# Patient Record
Sex: MALE | Race: WHITE
[De-identification: names, ages, dates, MRNs, and addresses within clinical notes are randomized per-mention and may not be internally consistent; named-entity substitution may affect disease eponyms.]

---

## 2019-06-21 ENCOUNTER — HOSPITAL ENCOUNTER (EMERGENCY)
Dept: HOSPITAL 95 - ER | Age: 54
Discharge: HOME | End: 2019-06-21
Payer: MEDICARE

## 2019-06-21 VITALS — BODY MASS INDEX: 24.5 KG/M2 | HEIGHT: 71 IN | WEIGHT: 175 LBS

## 2019-06-21 DIAGNOSIS — I10: ICD-10-CM

## 2019-06-21 DIAGNOSIS — G40.909: ICD-10-CM

## 2019-06-21 DIAGNOSIS — Z79.899: ICD-10-CM

## 2019-06-21 DIAGNOSIS — N39.0: Primary | ICD-10-CM

## 2019-06-21 DIAGNOSIS — R53.1: ICD-10-CM

## 2019-06-21 DIAGNOSIS — F32.9: ICD-10-CM

## 2019-06-21 DIAGNOSIS — G47.00: ICD-10-CM

## 2019-06-21 LAB
ALBUMIN SERPL BCP-MCNC: 3.6 G/DL (ref 3.4–5)
ALBUMIN/GLOB SERPL: 0.7 {RATIO} (ref 0.8–1.8)
ALT SERPL W P-5'-P-CCNC: 26 U/L (ref 12–78)
ANION GAP SERPL CALCULATED.4IONS-SCNC: 9 MMOL/L (ref 6–16)
AST SERPL W P-5'-P-CCNC: 44 U/L (ref 12–37)
BASOPHILS # BLD AUTO: 0.01 K/MM3 (ref 0–0.23)
BASOPHILS NFR BLD AUTO: 0 % (ref 0–2)
BILIRUB SERPL-MCNC: 0.3 MG/DL (ref 0.1–1)
BUN SERPL-MCNC: 25 MG/DL (ref 8–24)
CALCIUM SERPL-MCNC: 9.4 MG/DL (ref 8.5–10.1)
CHLORIDE SERPL-SCNC: 94 MMOL/L (ref 98–108)
CO2 SERPL-SCNC: 28 MMOL/L (ref 21–32)
CREAT SERPL-MCNC: 0.75 MG/DL (ref 0.6–1.2)
DEPRECATED RDW RBC AUTO: 46 FL (ref 35.1–46.3)
EOSINOPHIL # BLD AUTO: 0.01 K/MM3 (ref 0–0.68)
EOSINOPHIL NFR BLD AUTO: 0 % (ref 0–6)
ERYTHROCYTE [DISTWIDTH] IN BLOOD BY AUTOMATED COUNT: 13.5 % (ref 11.7–14.2)
GLOBULIN SER CALC-MCNC: 5.4 G/DL (ref 2.2–4)
GLUCOSE SERPL-MCNC: 126 MG/DL (ref 70–99)
HCT VFR BLD AUTO: 37.2 % (ref 37–53)
HGB BLD-MCNC: 12.3 G/DL (ref 13.5–17.5)
IMM GRANULOCYTES # BLD AUTO: 0.02 K/MM3 (ref 0–0.1)
IMM GRANULOCYTES NFR BLD AUTO: 1 % (ref 0–1)
KETONES UR STRIP-MCNC: (no result) MG/DL
LYMPHOCYTES # BLD AUTO: 0.91 K/MM3 (ref 0.84–5.2)
LYMPHOCYTES NFR BLD AUTO: 29 % (ref 21–46)
MCHC RBC AUTO-ENTMCNC: 33.1 G/DL (ref 31.5–36.5)
MCV RBC AUTO: 92 FL (ref 80–100)
MONOCYTES # BLD AUTO: 0.34 K/MM3 (ref 0.16–1.47)
MONOCYTES NFR BLD AUTO: 11 % (ref 4–13)
NEUTROPHILS # BLD AUTO: 1.81 K/MM3 (ref 1.96–9.15)
NEUTROPHILS NFR BLD AUTO: 58 % (ref 41–73)
NRBC # BLD AUTO: 0 K/MM3 (ref 0–0.02)
NRBC BLD AUTO-RTO: 0 /100 WBC (ref 0–0.2)
PLATELET # BLD AUTO: 213 K/MM3 (ref 150–400)
POTASSIUM SERPL-SCNC: 3.9 MMOL/L (ref 3.5–5.5)
PROT SERPL-MCNC: 9 G/DL (ref 6.4–8.2)
RBC #/AREA URNS HPF: (no result) /HPF (ref 0–2)
SODIUM SERPL-SCNC: 131 MMOL/L (ref 136–145)
SP GR SPEC: 1.01 (ref 1–1.02)
UROBILINOGEN UR STRIP-MCNC: (no result) MG/DL
WBC #/AREA URNS HPF: (no result) /HPF (ref 0–5)

## 2024-11-09 ENCOUNTER — HOSPITAL ENCOUNTER (EMERGENCY)
Dept: HOSPITAL 95 - ER | Age: 59
Discharge: HOME | End: 2024-11-09
Payer: MEDICARE

## 2024-11-09 VITALS — SYSTOLIC BLOOD PRESSURE: 126 MMHG | DIASTOLIC BLOOD PRESSURE: 80 MMHG

## 2024-11-09 VITALS — HEIGHT: 69 IN | BODY MASS INDEX: 46.65 KG/M2 | WEIGHT: 315 LBS

## 2024-11-09 DIAGNOSIS — K59.09: ICD-10-CM

## 2024-11-09 DIAGNOSIS — F05: ICD-10-CM

## 2024-11-09 DIAGNOSIS — Z79.899: ICD-10-CM

## 2024-11-09 DIAGNOSIS — F03.90: Primary | ICD-10-CM

## 2024-11-09 DIAGNOSIS — R74.01: ICD-10-CM

## 2024-11-09 DIAGNOSIS — G47.00: ICD-10-CM

## 2024-11-09 DIAGNOSIS — G40.909: ICD-10-CM

## 2024-11-09 DIAGNOSIS — I10: ICD-10-CM

## 2024-11-09 LAB
ALBUMIN SERPL BCP-MCNC: 4.1 G/DL (ref 3.4–5)
ALBUMIN/GLOB SERPL: 1.1 {RATIO} (ref 0.8–1.8)
ALT SERPL W P-5'-P-CCNC: 101 U/L (ref 12–78)
ANION GAP SERPL CALCULATED.4IONS-SCNC: 11 MMOL/L (ref 3–11)
AST SERPL W P-5'-P-CCNC: 518 U/L (ref 12–37)
BASOPHILS # BLD AUTO: 0.02 K/MM3 (ref 0–0.23)
BASOPHILS NFR BLD AUTO: 0 % (ref 0–2)
BILIRUB SERPL-MCNC: 0.4 MG/DL (ref 0.1–1)
BUN SERPL-MCNC: 37 MG/DL (ref 8–24)
CALCIUM SERPL-MCNC: 9.5 MG/DL (ref 8.5–10.1)
CHLORIDE SERPL-SCNC: 114 MMOL/L (ref 98–108)
CO2 SERPL-SCNC: 25 MMOL/L (ref 21–32)
CREAT SERPL-MCNC: 0.79 MG/DL (ref 0.6–1.2)
DEPRECATED RDW RBC AUTO: 47.5 FL (ref 35.1–46.3)
EOSINOPHIL # BLD AUTO: 0.01 K/MM3 (ref 0–0.68)
EOSINOPHIL NFR BLD AUTO: 0 % (ref 0–6)
ERYTHROCYTE [DISTWIDTH] IN BLOOD BY AUTOMATED COUNT: 13.5 % (ref 11.7–14.2)
GLOBULIN SER CALC-MCNC: 3.9 G/DL (ref 2.2–4)
GLUCOSE SERPL-MCNC: 112 MG/DL (ref 70–99)
HCT VFR BLD AUTO: 38.1 % (ref 37–53)
HGB BLD-MCNC: 12.7 G/DL (ref 13.5–17.5)
IMM GRANULOCYTES # BLD AUTO: 0.02 K/MM3 (ref 0–0.1)
IMM GRANULOCYTES NFR BLD AUTO: 0 % (ref 0–1)
KETONES UR STRIP-MCNC: (no result) MG/DL
LYMPHOCYTES # BLD AUTO: 1.06 K/MM3 (ref 0.84–5.2)
LYMPHOCYTES NFR BLD AUTO: 13 % (ref 21–46)
MAGNESIUM SERPL-MCNC: 2.7 MG/DL (ref 1.6–2.4)
MCHC RBC AUTO-ENTMCNC: 33.3 G/DL (ref 31.5–36.5)
MCV RBC AUTO: 95 FL (ref 80–100)
MONOCYTES # BLD AUTO: 1.09 K/MM3 (ref 0.16–1.47)
MONOCYTES NFR BLD AUTO: 13 % (ref 4–13)
NEUTROPHILS # BLD AUTO: 6.02 K/MM3 (ref 1.96–9.15)
NEUTROPHILS NFR BLD AUTO: 73 % (ref 41–73)
NRBC # BLD AUTO: 0 K/MM3 (ref 0–0.02)
NRBC BLD AUTO-RTO: 0 /100 WBC (ref 0–0.2)
PLATELET # BLD AUTO: 196 K/MM3 (ref 150–400)
POTASSIUM SERPL-SCNC: 3.7 MMOL/L (ref 3.5–5.5)
PROT SERPL-MCNC: 8 G/DL (ref 6.4–8.2)
PROT UR STRIP-MCNC: (no result) MG/DL
RBC #/AREA URNS HPF: (no result) /HPF (ref 0–2)
SODIUM SERPL-SCNC: 146 MMOL/L (ref 136–145)
SP GR SPEC: 1.02 (ref 1–1.02)
UROBILINOGEN UR STRIP-MCNC: (no result) MG/DL
WBC #/AREA URNS HPF: (no result) /HPF (ref 0–5)

## 2024-11-10 ENCOUNTER — HOSPITAL ENCOUNTER (EMERGENCY)
Dept: HOSPITAL 95 - ER | Age: 59
Discharge: HOME | End: 2024-11-10
Payer: MEDICARE

## 2024-11-10 VITALS — DIASTOLIC BLOOD PRESSURE: 93 MMHG | SYSTOLIC BLOOD PRESSURE: 141 MMHG

## 2024-11-10 VITALS — WEIGHT: 180.01 LBS | HEIGHT: 72 IN | BODY MASS INDEX: 24.38 KG/M2

## 2024-11-10 DIAGNOSIS — G47.00: Primary | ICD-10-CM

## 2024-11-10 DIAGNOSIS — G40.909: ICD-10-CM

## 2024-11-10 DIAGNOSIS — G20.A1: ICD-10-CM

## 2024-11-10 DIAGNOSIS — Z79.899: ICD-10-CM

## 2024-11-10 DIAGNOSIS — Z59.89: ICD-10-CM

## 2024-11-10 DIAGNOSIS — I10: ICD-10-CM

## 2024-11-10 DIAGNOSIS — F79: ICD-10-CM

## 2024-11-10 PROCEDURE — A9270 NON-COVERED ITEM OR SERVICE: HCPCS

## 2024-11-10 SDOH — ECONOMIC STABILITY - INCOME SECURITY: OTHER PROBLEMS RELATED TO HOUSING AND ECONOMIC CIRCUMSTANCES: Z59.89

## 2024-11-12 LAB — LAMOTRIGINE SERPL-MCNC: 16.3 UG/ML (ref 3–15)

## 2024-11-13 ENCOUNTER — HOSPITAL ENCOUNTER (OUTPATIENT)
Dept: HOSPITAL 95 - ER | Age: 59
Setting detail: OBSERVATION
LOS: 1 days | Discharge: HOME | End: 2024-11-14
Attending: STUDENT IN AN ORGANIZED HEALTH CARE EDUCATION/TRAINING PROGRAM | Admitting: STUDENT IN AN ORGANIZED HEALTH CARE EDUCATION/TRAINING PROGRAM
Payer: MEDICARE

## 2024-11-13 VITALS — SYSTOLIC BLOOD PRESSURE: 113 MMHG | DIASTOLIC BLOOD PRESSURE: 72 MMHG

## 2024-11-13 VITALS — HEIGHT: 69 IN | WEIGHT: 159.99 LBS | BODY MASS INDEX: 23.7 KG/M2

## 2024-11-13 DIAGNOSIS — I10: ICD-10-CM

## 2024-11-13 DIAGNOSIS — G40.909: ICD-10-CM

## 2024-11-13 DIAGNOSIS — G20.A1: ICD-10-CM

## 2024-11-13 DIAGNOSIS — F20.9: Primary | ICD-10-CM

## 2024-11-13 DIAGNOSIS — Z79.899: ICD-10-CM

## 2024-11-13 LAB
ALBUMIN SERPL BCP-MCNC: 3.3 G/DL (ref 3.4–5)
ALBUMIN/GLOB SERPL: 0.9 {RATIO} (ref 0.8–1.8)
ALT SERPL W P-5'-P-CCNC: 88 U/L (ref 12–78)
ANION GAP SERPL CALCULATED.4IONS-SCNC: 12 MMOL/L (ref 3–11)
APAP SERPL-MCNC: <2 UG/ML (ref 10–30)
AST SERPL W P-5'-P-CCNC: 183 U/L (ref 12–37)
BASOPHILS # BLD AUTO: 0.01 K/MM3 (ref 0–0.23)
BASOPHILS NFR BLD AUTO: 0 % (ref 0–2)
BENZODIAZ UR-MCNC: DETECTED UG/L
BILIRUB SERPL-MCNC: 0.4 MG/DL (ref 0.1–1)
BUN SERPL-MCNC: 45 MG/DL (ref 8–24)
CALCIUM SERPL-MCNC: 8.6 MG/DL (ref 8.5–10.1)
CANNABINOIDS UR QL: DETECTED
CHLORIDE SERPL-SCNC: 108 MMOL/L (ref 98–108)
CO2 SERPL-SCNC: 25 MMOL/L (ref 21–32)
CREAT SERPL-MCNC: 0.64 MG/DL (ref 0.6–1.2)
DEPRECATED RDW RBC AUTO: 42.5 FL (ref 35.1–46.3)
EOSINOPHIL # BLD AUTO: 0 K/MM3 (ref 0–0.68)
EOSINOPHIL NFR BLD AUTO: 0 % (ref 0–6)
ERYTHROCYTE [DISTWIDTH] IN BLOOD BY AUTOMATED COUNT: 12.7 % (ref 11.7–14.2)
ETHANOL SERPL-MCNC: <3 MG/DL
FLUAV RNA SPEC QL NAA+PROBE: NEGATIVE
FLUBV RNA SPEC QL NAA+PROBE: NEGATIVE
GLOBULIN SER CALC-MCNC: 3.7 G/DL (ref 2.2–4)
GLUCOSE SERPL-MCNC: 101 MG/DL (ref 70–99)
HCT VFR BLD AUTO: 35 % (ref 37–53)
HGB BLD-MCNC: 12.1 G/DL (ref 13.5–17.5)
IMM GRANULOCYTES # BLD AUTO: 0.02 K/MM3 (ref 0–0.1)
IMM GRANULOCYTES NFR BLD AUTO: 0 % (ref 0–1)
KETONES UR STRIP-MCNC: (no result) MG/DL
LYMPHOCYTES # BLD AUTO: 1.09 K/MM3 (ref 0.84–5.2)
LYMPHOCYTES NFR BLD AUTO: 15 % (ref 21–46)
MCHC RBC AUTO-ENTMCNC: 34.6 G/DL (ref 31.5–36.5)
MCV RBC AUTO: 92 FL (ref 80–100)
MONOCYTES # BLD AUTO: 0.82 K/MM3 (ref 0.16–1.47)
MONOCYTES NFR BLD AUTO: 11 % (ref 4–13)
NEUTROPHILS # BLD AUTO: 5.38 K/MM3 (ref 1.96–9.15)
NEUTROPHILS NFR BLD AUTO: 74 % (ref 41–73)
NRBC # BLD AUTO: 0 K/MM3 (ref 0–0.02)
NRBC BLD AUTO-RTO: 0 /100 WBC (ref 0–0.2)
PLATELET # BLD AUTO: 149 K/MM3 (ref 150–400)
POTASSIUM SERPL-SCNC: 3.9 MMOL/L (ref 3.5–5.5)
PROT SERPL-MCNC: 7 G/DL (ref 6.4–8.2)
PROT UR STRIP-MCNC: (no result) MG/DL
RBC #/AREA URNS HPF: (no result) /HPF (ref 0–2)
RSV RNA SPEC QL NAA+PROBE: NEGATIVE
SALICYLATES SERPL-MCNC: <1.7 MG/DL (ref 2.8–20)
SARS-COV-2 RNA RESP QL NAA+PROBE: NEGATIVE
SODIUM SERPL-SCNC: 141 MMOL/L (ref 136–145)
SP GR SPEC: 1.01 (ref 1–1.02)
UROBILINOGEN UR STRIP-MCNC: (no result) MG/DL
WBC #/AREA URNS HPF: (no result) /HPF (ref 0–5)

## 2024-11-13 PROCEDURE — G0378 HOSPITAL OBSERVATION PER HR: HCPCS

## 2024-11-13 PROCEDURE — A9270 NON-COVERED ITEM OR SERVICE: HCPCS

## 2024-11-13 PROCEDURE — G0480 DRUG TEST DEF 1-7 CLASSES: HCPCS

## 2024-12-05 ENCOUNTER — HOSPITAL ENCOUNTER (INPATIENT)
Dept: HOSPITAL 95 - ER | Age: 59
LOS: 8 days | Discharge: HOME HEALTH | DRG: 673 | End: 2024-12-13
Attending: HOSPITALIST | Admitting: HOSPITALIST
Payer: MEDICARE

## 2024-12-05 VITALS — HEIGHT: 72 IN | WEIGHT: 154.98 LBS | BODY MASS INDEX: 20.99 KG/M2

## 2024-12-05 VITALS — SYSTOLIC BLOOD PRESSURE: 113 MMHG | DIASTOLIC BLOOD PRESSURE: 74 MMHG

## 2024-12-05 VITALS — DIASTOLIC BLOOD PRESSURE: 62 MMHG | SYSTOLIC BLOOD PRESSURE: 103 MMHG

## 2024-12-05 DIAGNOSIS — I10: ICD-10-CM

## 2024-12-05 DIAGNOSIS — F20.9: ICD-10-CM

## 2024-12-05 DIAGNOSIS — F84.0: ICD-10-CM

## 2024-12-05 DIAGNOSIS — K52.1: ICD-10-CM

## 2024-12-05 DIAGNOSIS — L89.154: ICD-10-CM

## 2024-12-05 DIAGNOSIS — B95.61: ICD-10-CM

## 2024-12-05 DIAGNOSIS — T36.95XA: ICD-10-CM

## 2024-12-05 DIAGNOSIS — E87.6: ICD-10-CM

## 2024-12-05 DIAGNOSIS — N17.9: Primary | ICD-10-CM

## 2024-12-05 DIAGNOSIS — K59.00: ICD-10-CM

## 2024-12-05 DIAGNOSIS — E44.0: ICD-10-CM

## 2024-12-05 DIAGNOSIS — R62.50: ICD-10-CM

## 2024-12-05 DIAGNOSIS — E83.39: ICD-10-CM

## 2024-12-05 DIAGNOSIS — F41.9: ICD-10-CM

## 2024-12-05 DIAGNOSIS — G47.00: ICD-10-CM

## 2024-12-05 DIAGNOSIS — G40.909: ICD-10-CM

## 2024-12-05 DIAGNOSIS — K21.9: ICD-10-CM

## 2024-12-05 DIAGNOSIS — R33.9: ICD-10-CM

## 2024-12-05 DIAGNOSIS — G20.A1: ICD-10-CM

## 2024-12-05 DIAGNOSIS — B95.7: ICD-10-CM

## 2024-12-05 DIAGNOSIS — E87.20: ICD-10-CM

## 2024-12-05 DIAGNOSIS — F32.A: ICD-10-CM

## 2024-12-05 LAB
ALBUMIN SERPL BCP-MCNC: 3.8 G/DL (ref 3.4–5)
ALBUMIN/GLOB SERPL: 1 {RATIO} (ref 0.8–1.8)
ALT SERPL W P-5'-P-CCNC: 25 U/L (ref 12–78)
ANION GAP SERPL CALCULATED.4IONS-SCNC: 17 MMOL/L (ref 3–11)
AST SERPL W P-5'-P-CCNC: 58 U/L (ref 12–37)
BASOPHILS # BLD AUTO: 0.02 K/MM3 (ref 0–0.23)
BASOPHILS NFR BLD AUTO: 0 % (ref 0–2)
BILIRUB SERPL-MCNC: 0.5 MG/DL (ref 0.1–1)
BUN SERPL-MCNC: 33 MG/DL (ref 8–24)
CALCIUM SERPL-MCNC: 9.8 MG/DL (ref 8.5–10.1)
CHLORIDE SERPL-SCNC: 106 MMOL/L (ref 98–108)
CO2 SERPL-SCNC: 20 MMOL/L (ref 21–32)
CREAT SERPL-MCNC: 1.71 MG/DL (ref 0.6–1.2)
DEPRECATED RDW RBC AUTO: 46.7 FL (ref 35.1–46.3)
EOSINOPHIL # BLD AUTO: 0 K/MM3 (ref 0–0.68)
EOSINOPHIL NFR BLD AUTO: 0 % (ref 0–6)
ERYTHROCYTE [DISTWIDTH] IN BLOOD BY AUTOMATED COUNT: 13.5 % (ref 11.7–14.2)
GLOBULIN SER CALC-MCNC: 3.9 G/DL (ref 2.2–4)
GLUCOSE SERPL-MCNC: 106 MG/DL (ref 70–99)
HCT VFR BLD AUTO: 36.5 % (ref 37–53)
HGB BLD-MCNC: 12.2 G/DL (ref 13.5–17.5)
IMM GRANULOCYTES # BLD AUTO: 0.03 K/MM3 (ref 0–0.1)
IMM GRANULOCYTES NFR BLD AUTO: 0 % (ref 0–1)
KETONES UR STRIP-MCNC: (no result) MG/DL
KETONES UR STRIP-MCNC: (no result) MG/DL
LYMPHOCYTES # BLD AUTO: 0.73 K/MM3 (ref 0.84–5.2)
LYMPHOCYTES NFR BLD AUTO: 8 % (ref 21–46)
MCHC RBC AUTO-ENTMCNC: 33.4 G/DL (ref 31.5–36.5)
MCV RBC AUTO: 94 FL (ref 80–100)
MONOCYTES # BLD AUTO: 1 K/MM3 (ref 0.16–1.47)
MONOCYTES NFR BLD AUTO: 11 % (ref 4–13)
NEUTROPHILS # BLD AUTO: 7.1 K/MM3 (ref 1.96–9.15)
NEUTROPHILS NFR BLD AUTO: 80 % (ref 41–73)
NRBC # BLD AUTO: 0 K/MM3 (ref 0–0.02)
NRBC BLD AUTO-RTO: 0 /100 WBC (ref 0–0.2)
PLATELET # BLD AUTO: 204 K/MM3 (ref 150–400)
POTASSIUM SERPL-SCNC: 4.1 MMOL/L (ref 3.5–5.5)
PROT SERPL-MCNC: 7.7 G/DL (ref 6.4–8.2)
PROT UR STRIP-MCNC: (no result) MG/DL
PROT UR STRIP-MCNC: (no result) MG/DL
RBC #/AREA URNS HPF: (no result) /HPF (ref 0–2)
RBC #/AREA URNS HPF: (no result) /HPF (ref 0–2)
SODIUM SERPL-SCNC: 139 MMOL/L (ref 136–145)
SP GR SPEC: 1.01 (ref 1–1.02)
SP GR SPEC: 1.01 (ref 1–1.02)
URN SPEC COLLECT METH UR: (no result)
UROBILINOGEN UR STRIP-MCNC: (no result) MG/DL
UROBILINOGEN UR STRIP-MCNC: (no result) MG/DL
WBC #/AREA URNS HPF: (no result) /HPF (ref 0–5)
WBC #/AREA URNS HPF: (no result) /HPF (ref 0–5)

## 2024-12-05 PROCEDURE — A9270 NON-COVERED ITEM OR SERVICE: HCPCS

## 2024-12-05 PROCEDURE — G0378 HOSPITAL OBSERVATION PER HR: HCPCS

## 2024-12-05 PROCEDURE — 0T9B70Z DRAINAGE OF BLADDER WITH DRAINAGE DEVICE, VIA NATURAL OR ARTIFICIAL OPENING: ICD-10-PCS | Performed by: HOSPITALIST

## 2024-12-06 VITALS — SYSTOLIC BLOOD PRESSURE: 120 MMHG | DIASTOLIC BLOOD PRESSURE: 96 MMHG

## 2024-12-06 VITALS — SYSTOLIC BLOOD PRESSURE: 121 MMHG | DIASTOLIC BLOOD PRESSURE: 87 MMHG

## 2024-12-06 VITALS — DIASTOLIC BLOOD PRESSURE: 84 MMHG | SYSTOLIC BLOOD PRESSURE: 115 MMHG

## 2024-12-06 VITALS — SYSTOLIC BLOOD PRESSURE: 143 MMHG | DIASTOLIC BLOOD PRESSURE: 95 MMHG

## 2024-12-06 LAB
ANION GAP SERPL CALCULATED.4IONS-SCNC: 9 MMOL/L (ref 3–11)
BASOPHILS # BLD AUTO: 0.01 K/MM3 (ref 0–0.23)
BASOPHILS NFR BLD AUTO: 0 % (ref 0–2)
BUN SERPL-MCNC: 24 MG/DL (ref 8–24)
CALCIUM SERPL-MCNC: 8.8 MG/DL (ref 8.5–10.1)
CHLORIDE SERPL-SCNC: 104 MMOL/L (ref 98–108)
CO2 SERPL-SCNC: 28 MMOL/L (ref 21–32)
CREAT SERPL-MCNC: 0.62 MG/DL (ref 0.6–1.2)
DEPRECATED RDW RBC AUTO: 46.4 FL (ref 35.1–46.3)
EOSINOPHIL # BLD AUTO: 0.01 K/MM3 (ref 0–0.68)
EOSINOPHIL NFR BLD AUTO: 0 % (ref 0–6)
ERYTHROCYTE [DISTWIDTH] IN BLOOD BY AUTOMATED COUNT: 13.6 % (ref 11.7–14.2)
GLUCOSE SERPL-MCNC: 124 MG/DL (ref 70–99)
HCT VFR BLD AUTO: 32.2 % (ref 37–53)
HGB BLD-MCNC: 10.9 G/DL (ref 13.5–17.5)
IMM GRANULOCYTES # BLD AUTO: 0.02 K/MM3 (ref 0–0.1)
IMM GRANULOCYTES NFR BLD AUTO: 0 % (ref 0–1)
LYMPHOCYTES # BLD AUTO: 1.08 K/MM3 (ref 0.84–5.2)
LYMPHOCYTES NFR BLD AUTO: 20 % (ref 21–46)
MCHC RBC AUTO-ENTMCNC: 33.9 G/DL (ref 31.5–36.5)
MCV RBC AUTO: 93 FL (ref 80–100)
MONOCYTES # BLD AUTO: 0.75 K/MM3 (ref 0.16–1.47)
MONOCYTES NFR BLD AUTO: 14 % (ref 4–13)
NEUTROPHILS # BLD AUTO: 3.46 K/MM3 (ref 1.96–9.15)
NEUTROPHILS NFR BLD AUTO: 65 % (ref 41–73)
NRBC # BLD AUTO: 0 K/MM3 (ref 0–0.02)
NRBC BLD AUTO-RTO: 0 /100 WBC (ref 0–0.2)
PLATELET # BLD AUTO: 178 K/MM3 (ref 150–400)
POTASSIUM SERPL-SCNC: 3.5 MMOL/L (ref 3.5–5.5)
SODIUM SERPL-SCNC: 137 MMOL/L (ref 136–145)

## 2024-12-06 PROCEDURE — 0JB70ZZ EXCISION OF BACK SUBCUTANEOUS TISSUE AND FASCIA, OPEN APPROACH: ICD-10-PCS | Performed by: STUDENT IN AN ORGANIZED HEALTH CARE EDUCATION/TRAINING PROGRAM

## 2024-12-06 NOTE — NUR
after several attempts to have pt have a bm i enc him to stand and transfer to
toilet. pt had very large watery stooland stated he felt better. pt ambulated
well with only stand by assist.

## 2024-12-06 NOTE — NUR
sx cancelled for today because pt had eaten a couple bites of food, pt to
possibly have sx tomorrow.
repeatedly asked pt if he was having pain but he couldnt answer appropriatly,
pt has no grimice but abd is tight and he is refusing to eat.

## 2024-12-06 NOTE — NUR
NOC SUMMARY- PT YELLS OUT AT TIMES. PT SAYS OUCH. PT REPORTS ABD AND BLADDER
DISCOMFORT. PT FOUND TO HAVE A FEVER AND PROVIDER CALLED AND PO TYLENOL WAS
ORDERED. PT DISCOMFORT AND FEVER RESPONDED WELL TO TYLENOL. PT SEEKS ATTENTION
FREQUENTLY. PT HAD A SMALL BM NOTED. PT ENCOURAGED TO DRINK FLUIDS. PT HAS NOT
SLEPT ALL SHIFT. PT HAS BEEN WATCHING TV. HORNER IS DRAINING TO GRAVITY. CALL
LIGHT IN REACH.

## 2024-12-06 NOTE — NUR
uneventful day for pt very difficult to understand or get point across, pt
follows directions but only if directed. md from consult was in to assess
coccyx wound pt was hesitant to have us look but finally allowed us, possible
sx today for i/d.

## 2024-12-07 VITALS — DIASTOLIC BLOOD PRESSURE: 94 MMHG | SYSTOLIC BLOOD PRESSURE: 135 MMHG

## 2024-12-07 VITALS — SYSTOLIC BLOOD PRESSURE: 115 MMHG | DIASTOLIC BLOOD PRESSURE: 82 MMHG

## 2024-12-07 VITALS — DIASTOLIC BLOOD PRESSURE: 82 MMHG | SYSTOLIC BLOOD PRESSURE: 107 MMHG

## 2024-12-07 VITALS — DIASTOLIC BLOOD PRESSURE: 85 MMHG | SYSTOLIC BLOOD PRESSURE: 110 MMHG

## 2024-12-07 VITALS — SYSTOLIC BLOOD PRESSURE: 108 MMHG | DIASTOLIC BLOOD PRESSURE: 73 MMHG

## 2024-12-07 VITALS — SYSTOLIC BLOOD PRESSURE: 109 MMHG | DIASTOLIC BLOOD PRESSURE: 72 MMHG

## 2024-12-07 VITALS — DIASTOLIC BLOOD PRESSURE: 81 MMHG | SYSTOLIC BLOOD PRESSURE: 122 MMHG

## 2024-12-07 VITALS — DIASTOLIC BLOOD PRESSURE: 69 MMHG | SYSTOLIC BLOOD PRESSURE: 117 MMHG

## 2024-12-07 VITALS — SYSTOLIC BLOOD PRESSURE: 126 MMHG | DIASTOLIC BLOOD PRESSURE: 84 MMHG

## 2024-12-07 VITALS — SYSTOLIC BLOOD PRESSURE: 136 MMHG | DIASTOLIC BLOOD PRESSURE: 91 MMHG

## 2024-12-07 VITALS — SYSTOLIC BLOOD PRESSURE: 111 MMHG | DIASTOLIC BLOOD PRESSURE: 80 MMHG

## 2024-12-07 VITALS — DIASTOLIC BLOOD PRESSURE: 78 MMHG | SYSTOLIC BLOOD PRESSURE: 109 MMHG

## 2024-12-07 VITALS — SYSTOLIC BLOOD PRESSURE: 106 MMHG | DIASTOLIC BLOOD PRESSURE: 73 MMHG

## 2024-12-07 VITALS — DIASTOLIC BLOOD PRESSURE: 81 MMHG | SYSTOLIC BLOOD PRESSURE: 114 MMHG

## 2024-12-07 VITALS — SYSTOLIC BLOOD PRESSURE: 117 MMHG | DIASTOLIC BLOOD PRESSURE: 85 MMHG

## 2024-12-07 VITALS — SYSTOLIC BLOOD PRESSURE: 109 MMHG | DIASTOLIC BLOOD PRESSURE: 61 MMHG

## 2024-12-07 VITALS — SYSTOLIC BLOOD PRESSURE: 118 MMHG | DIASTOLIC BLOOD PRESSURE: 84 MMHG

## 2024-12-07 VITALS — SYSTOLIC BLOOD PRESSURE: 145 MMHG | DIASTOLIC BLOOD PRESSURE: 97 MMHG

## 2024-12-07 VITALS — DIASTOLIC BLOOD PRESSURE: 79 MMHG | SYSTOLIC BLOOD PRESSURE: 116 MMHG

## 2024-12-07 VITALS — SYSTOLIC BLOOD PRESSURE: 114 MMHG | DIASTOLIC BLOOD PRESSURE: 87 MMHG

## 2024-12-07 LAB
ANION GAP SERPL CALCULATED.4IONS-SCNC: 10 MMOL/L (ref 3–11)
BASOPHILS # BLD AUTO: 0.01 K/MM3 (ref 0–0.23)
BASOPHILS NFR BLD AUTO: 0 % (ref 0–2)
BUN SERPL-MCNC: 27 MG/DL (ref 8–24)
CALCIUM SERPL-MCNC: 9 MG/DL (ref 8.5–10.1)
CHLORIDE SERPL-SCNC: 107 MMOL/L (ref 98–108)
CO2 SERPL-SCNC: 24 MMOL/L (ref 21–32)
CREAT SERPL-MCNC: 0.6 MG/DL (ref 0.6–1.2)
DEPRECATED RDW RBC AUTO: 45.2 FL (ref 35.1–46.3)
EOSINOPHIL # BLD AUTO: 0 K/MM3 (ref 0–0.68)
EOSINOPHIL NFR BLD AUTO: 0 % (ref 0–6)
ERYTHROCYTE [DISTWIDTH] IN BLOOD BY AUTOMATED COUNT: 13.2 % (ref 11.7–14.2)
GLUCOSE SERPL-MCNC: 121 MG/DL (ref 70–99)
HCT VFR BLD AUTO: 39.5 % (ref 37–53)
HGB BLD-MCNC: 13.3 G/DL (ref 13.5–17.5)
IMM GRANULOCYTES # BLD AUTO: 0.02 K/MM3 (ref 0–0.1)
IMM GRANULOCYTES NFR BLD AUTO: 0 % (ref 0–1)
LYMPHOCYTES # BLD AUTO: 0.94 K/MM3 (ref 0.84–5.2)
LYMPHOCYTES NFR BLD AUTO: 12 % (ref 21–46)
MCHC RBC AUTO-ENTMCNC: 33.7 G/DL (ref 31.5–36.5)
MCV RBC AUTO: 93 FL (ref 80–100)
MONOCYTES # BLD AUTO: 0.75 K/MM3 (ref 0.16–1.47)
MONOCYTES NFR BLD AUTO: 10 % (ref 4–13)
NEUTROPHILS # BLD AUTO: 5.9 K/MM3 (ref 1.96–9.15)
NEUTROPHILS NFR BLD AUTO: 78 % (ref 41–73)
NRBC # BLD AUTO: 0 K/MM3 (ref 0–0.02)
NRBC BLD AUTO-RTO: 0 /100 WBC (ref 0–0.2)
PLATELET # BLD AUTO: 210 K/MM3 (ref 150–400)
POTASSIUM SERPL-SCNC: 3.3 MMOL/L (ref 3.5–5.5)
SODIUM SERPL-SCNC: 138 MMOL/L (ref 136–145)

## 2024-12-07 NOTE — NUR
PT TO PACU VIA BED FROM  FOR PREOP CARE AT 1145. RESPONDS USING SINGLE
WORDS TO QUESTIONS ASKED. RESTING QUIETLY. FEBRILE 99.4/VSS STABLE. DENIES
PAIN/NAUSEA. LR AT TKO NOW INFUSING. SURICAL PACK COMPLETE. SURICAL HAT/PAS
SLEEVES/BP CUFF IN PLACE. NO COMPLAINTS AT THIS TIME.

## 2024-12-07 NOTE — NUR
pt has been sleeping since surgery, opens eyes when spoke to or touched,
returns to sleep, v.s. stable, no other changes this shift, call light in
reach.

## 2024-12-07 NOTE — NUR
pt laying in bed, nonverbal at this time, makes eye contact and answers simple
questions, follows commands, lungs are clear t/o, resp even and unlabored, no
cough noted, hrr, no edema noted, ppp+2, cap refill <3 sec, vs stable,
afebrile, piv to lac site is clear and patent, btx hypoactive, abd flat soft
nontender, voids via browning cath at this time, draining yellow urine, skin has
large decub ulcer on sacrum, plan is for I&D today, will keep npo and hold
lovenox, maew, lee, call light in reach.

## 2024-12-07 NOTE — NUR
SHIFT SUMMARY
PATIENT HAD NO ACUTE CHANGES. ALERT TO SELF AND ONE ASSIST TO BSC. DENIES
CHEST PAIN, SOB, AND N/V. VSS/AFEBRILE. HORNER PATENT AND DRAINING TO GRAVITY
FOR RETENTION. NPO FOR I&D. PIV INTACT. IV ABX INFUSED. WATCHED TV FIRST PART
OF SHIFT. CALL LIGHT IN REACH. BED IN LOWEST POSITION. WILL CONTINUE TO
MONITOR UNTIL DAY SHIFT NURSE ASSUMES CARE.

## 2024-12-08 VITALS — SYSTOLIC BLOOD PRESSURE: 136 MMHG | DIASTOLIC BLOOD PRESSURE: 96 MMHG

## 2024-12-08 VITALS — SYSTOLIC BLOOD PRESSURE: 105 MMHG | DIASTOLIC BLOOD PRESSURE: 73 MMHG

## 2024-12-08 VITALS — SYSTOLIC BLOOD PRESSURE: 120 MMHG | DIASTOLIC BLOOD PRESSURE: 77 MMHG

## 2024-12-08 VITALS — DIASTOLIC BLOOD PRESSURE: 89 MMHG | SYSTOLIC BLOOD PRESSURE: 135 MMHG

## 2024-12-08 LAB
ALBUMIN SERPL BCP-MCNC: 2.6 G/DL (ref 3.4–5)
ALBUMIN/GLOB SERPL: 0.8 {RATIO} (ref 0.8–1.8)
ALT SERPL W P-5'-P-CCNC: 16 U/L (ref 12–78)
ANION GAP SERPL CALCULATED.4IONS-SCNC: 9 MMOL/L (ref 3–11)
AST SERPL W P-5'-P-CCNC: 17 U/L (ref 12–37)
BASOPHILS # BLD AUTO: 0.02 K/MM3 (ref 0–0.23)
BASOPHILS NFR BLD AUTO: 0 % (ref 0–2)
BILIRUB SERPL-MCNC: 0.4 MG/DL (ref 0.1–1)
BUN SERPL-MCNC: 26 MG/DL (ref 8–24)
CALCIUM SERPL-MCNC: 8.7 MG/DL (ref 8.5–10.1)
CHLORIDE SERPL-SCNC: 109 MMOL/L (ref 98–108)
CO2 SERPL-SCNC: 28 MMOL/L (ref 21–32)
CREAT SERPL-MCNC: 0.69 MG/DL (ref 0.6–1.2)
DEPRECATED RDW RBC AUTO: 46.2 FL (ref 35.1–46.3)
EOSINOPHIL # BLD AUTO: 0.01 K/MM3 (ref 0–0.68)
EOSINOPHIL NFR BLD AUTO: 0 % (ref 0–6)
ERYTHROCYTE [DISTWIDTH] IN BLOOD BY AUTOMATED COUNT: 13.2 % (ref 11.7–14.2)
GLOBULIN SER CALC-MCNC: 3.3 G/DL (ref 2.2–4)
GLUCOSE SERPL-MCNC: 102 MG/DL (ref 70–99)
HCT VFR BLD AUTO: 33.9 % (ref 37–53)
HGB BLD-MCNC: 11.2 G/DL (ref 13.5–17.5)
IMM GRANULOCYTES # BLD AUTO: 0.02 K/MM3 (ref 0–0.1)
IMM GRANULOCYTES NFR BLD AUTO: 0 % (ref 0–1)
LYMPHOCYTES # BLD AUTO: 1.34 K/MM3 (ref 0.84–5.2)
LYMPHOCYTES NFR BLD AUTO: 22 % (ref 21–46)
MCHC RBC AUTO-ENTMCNC: 33 G/DL (ref 31.5–36.5)
MCV RBC AUTO: 94 FL (ref 80–100)
MONOCYTES # BLD AUTO: 0.66 K/MM3 (ref 0.16–1.47)
MONOCYTES NFR BLD AUTO: 11 % (ref 4–13)
NEUTROPHILS # BLD AUTO: 4.1 K/MM3 (ref 1.96–9.15)
NEUTROPHILS NFR BLD AUTO: 67 % (ref 41–73)
NRBC # BLD AUTO: 0 K/MM3 (ref 0–0.02)
NRBC BLD AUTO-RTO: 0 /100 WBC (ref 0–0.2)
PLATELET # BLD AUTO: 188 K/MM3 (ref 150–400)
POTASSIUM SERPL-SCNC: 3.7 MMOL/L (ref 3.5–5.5)
PROT SERPL-MCNC: 5.9 G/DL (ref 6.4–8.2)
SODIUM SERPL-SCNC: 142 MMOL/L (ref 136–145)

## 2024-12-08 NOTE — NUR
SHIFT SUMMARY
PATIENT SOMNULENT T/O SHIFT AFTER PROCEDURE. ALERT TO SELF AND BEDREST AT
THIS TIME. PO MEDICATION HELD. NO S/SX OF CHEST PAIN, SOB, AND N/V. PIV
INTACT. IV ABXS INFUSED. VSS/AFEBRILE. HORNER PATIENT AND DRAINING TO GRAVITY.
CALL LIGHT IN REACH. BED IN LOWEST POSITION.  WILL CONTINUE TO MONITOR UNTIL
DAY SHIFT NURSE ASSUMES CARE.

## 2024-12-08 NOTE — NUR
NOTE
CHANGED PT WOUND DRESSING AT 1030. DRESSING WAS SATURATED WITH SANGUINEOUS
FLUID. CLEANED WOUND WITH 4X4 GAUZE AND WOUND CLEANSER. MOISTENED KERLIX WITH
0.25% DAKINS SOLUTION AND TIGHTLY PACKED INTO ALL ASPECTS OF THE WOUND FIRST
SPIRALING IN TO FILL SPACE. COVERED WITH GAUZE AND EXUDRY ABSORBENT DRESSING,
SECURED WITH TAPE. TOOK UPDATED PIC OF WOUND AND IN CHART. PT WAS AGITATION
DURING DRESSING CHANGE AND HIT STAFF BUT AFTER COMPLETED DRESSING CHANGED
SEEMED TO CALM DOWN. CALL LIGHT IN REACH, ABLE TO MAKE NEEDS KNOWN.

## 2024-12-08 NOTE — NUR
PT TRANSFERRED TO ROOM 346. REPORT GIVEN TO GUTIERREZ CORONADO RN. PT AND BELONGINGS
TRANSPORTED TO ROOM.

## 2024-12-08 NOTE — NUR
SHIFT SUMMARY
PT A&O TO SELF. PT CALLS OUT AND MAKES NEEDS KNOWN, PT ADMITTED DUE TO ION. PT
HAD WASH OUT YESTERDAY OF COCCYX WOUND. WOUND DRESSING CHANGED TODAY. PT ON
ROOM AIR. PT RECEIVED ANTIBIOTICS. VSS. PT HAS HORNER CATH FOR RETENTION, URINE
WAS FREEFLOWING WITH NO DEPENDENT LOOPS DURING SHIFT. PT WAS REPOSITIONED Q2
HR. PT REPORTED NO PAIN. PT WOULD REFUSED MEALS BUT DRANK ENSURES FOR
NUTRITION. PLAN IS FOR PLACEMENT. PT CALL LIGHT IN REACH.

## 2024-12-09 VITALS — SYSTOLIC BLOOD PRESSURE: 142 MMHG | DIASTOLIC BLOOD PRESSURE: 99 MMHG

## 2024-12-09 VITALS — SYSTOLIC BLOOD PRESSURE: 129 MMHG | DIASTOLIC BLOOD PRESSURE: 91 MMHG

## 2024-12-09 VITALS — DIASTOLIC BLOOD PRESSURE: 98 MMHG | SYSTOLIC BLOOD PRESSURE: 142 MMHG

## 2024-12-09 VITALS — DIASTOLIC BLOOD PRESSURE: 99 MMHG | SYSTOLIC BLOOD PRESSURE: 129 MMHG

## 2024-12-09 LAB
ANION GAP SERPL CALCULATED.4IONS-SCNC: 11 MMOL/L (ref 3–11)
BUN SERPL-MCNC: 27 MG/DL (ref 8–24)
CALCIUM SERPL-MCNC: 8.4 MG/DL (ref 8.5–10.1)
CHLORIDE SERPL-SCNC: 103 MMOL/L (ref 98–108)
CO2 SERPL-SCNC: 25 MMOL/L (ref 21–32)
CREAT SERPL-MCNC: 0.55 MG/DL (ref 0.6–1.2)
GLUCOSE SERPL-MCNC: 131 MG/DL (ref 70–99)
HCT VFR BLD AUTO: 36.4 % (ref 37–53)
HGB BLD-MCNC: 12.5 G/DL (ref 13.5–17.5)
MAGNESIUM SERPL-MCNC: 1.6 MG/DL (ref 1.6–2.4)
PHOSPHATE SERPL-MCNC: 3.2 MG/DL (ref 2.5–4.9)
POTASSIUM SERPL-SCNC: 2.5 MMOL/L (ref 3.5–5.5)
SODIUM SERPL-SCNC: 136 MMOL/L (ref 136–145)

## 2024-12-09 NOTE — NUR
DRESSING CHARGED
PT WOUND WAS FILLED WITH STOOL, WOUND WAS EXTENSIVLY IRRIGATED AND REBANDAGED,
PT DOESNT SEEM TO HAVE PAIN AT WOUND SITE BUT DOES HAVE ABD PAIN.

## 2024-12-09 NOTE — NUR
REPORT RECEIVED. PT CRYING OUT AND SEEMS UNCOMFORTABLE, PT BRIEF WAS FILLED
WITH WATERY STOOL PT ABD LARGE AND DISTENDED, ASSISTED PT TO BATHROOM, PT
AMBULATORY WITH STAND BY ASSIST.

## 2024-12-09 NOTE — NUR
pt medicated for pain
md was notifed that pt might not be able to express pain as one would. i noted
slight grimice and agitation when turned i request some pain medication with
hopes that it may bring relief.

## 2024-12-09 NOTE — NUR
pt was assisted out of bed to the bathroom several times throughout shift and
did very well with minimal assist.
the third time pt was assisted to the bathroom pt had major exsplosive event
and sprayed loose stool on floor wall and door of bathroom. pt was saftly
assisted back to bed and full bed bath done, dressing change done as well.
over all pt nani well and seems to feel much better.

## 2024-12-10 VITALS — SYSTOLIC BLOOD PRESSURE: 136 MMHG | DIASTOLIC BLOOD PRESSURE: 80 MMHG

## 2024-12-10 VITALS — SYSTOLIC BLOOD PRESSURE: 133 MMHG | DIASTOLIC BLOOD PRESSURE: 88 MMHG

## 2024-12-10 VITALS — DIASTOLIC BLOOD PRESSURE: 93 MMHG | SYSTOLIC BLOOD PRESSURE: 135 MMHG

## 2024-12-10 VITALS — DIASTOLIC BLOOD PRESSURE: 87 MMHG | SYSTOLIC BLOOD PRESSURE: 141 MMHG

## 2024-12-10 LAB
ALBUMIN SERPL BCP-MCNC: 2.7 G/DL (ref 3.4–5)
ANION GAP SERPL CALCULATED.4IONS-SCNC: 10 MMOL/L (ref 3–11)
BUN SERPL-MCNC: 24 MG/DL (ref 8–24)
CALCIUM SERPL-MCNC: 8.9 MG/DL (ref 8.5–10.1)
CHLORIDE SERPL-SCNC: 104 MMOL/L (ref 98–108)
CO2 SERPL-SCNC: 25 MMOL/L (ref 21–32)
CREAT SERPL-MCNC: 0.46 MG/DL (ref 0.6–1.2)
GLUCOSE SERPL-MCNC: 131 MG/DL (ref 70–99)
HCT VFR BLD AUTO: 34.3 % (ref 37–53)
HGB BLD-MCNC: 12 G/DL (ref 13.5–17.5)
PHOSPHATE SERPL-MCNC: 2.5 MG/DL (ref 2.5–4.9)
POTASSIUM SERPL-SCNC: 2.9 MMOL/L (ref 3.5–5.5)
SODIUM SERPL-SCNC: 136 MMOL/L (ref 136–145)

## 2024-12-10 NOTE — NUR
assumed care at noon and i spoke with dr pop about very hard abd and pt
complaining of constipation. enema that was held yestureday was enc today and
implemented. pt had about 2000mls of loose dark diarrhea and a lot of gas, pt
states he is feeling better.
dressing to coccyx area cdi, pt was able to use bsc making it better for his
wound.
prior to enema pt was given ativan and was very compliant and responsive. pt
now sleeping. call light at bedside

## 2024-12-10 NOTE — NUR
shift summary
 
patient did not rest well, several large loose explosive bowel movements, Abd
is very taut
and somewhat distended, received all bowel care meds as ordered,
Anticipating form stool due to CT showing constipation. Stage 4 sacral/coccyx
wound soiled and was redressed several times, Escobar care done after each BM
also. Pt not tolerating wound care c/o pain , Sam AGOSTO overnight requested prn
pain medication prior to dressing changes Pt spit out his percocet which was
crushed in applesauce,  recommended an IV pain med, still awaiting order,
relayed above to oncoming RN
applesauce

## 2024-12-10 NOTE — NUR
PO ANTIBIOTIC CRUSHED AND ATTEMPTED TO ADMINISTER TO PT. PT GAGGED W/ PO AND
REFUSED TO FINISH ANTIBIOTIC

## 2024-12-11 VITALS — SYSTOLIC BLOOD PRESSURE: 130 MMHG | DIASTOLIC BLOOD PRESSURE: 89 MMHG

## 2024-12-11 VITALS — SYSTOLIC BLOOD PRESSURE: 145 MMHG | DIASTOLIC BLOOD PRESSURE: 100 MMHG

## 2024-12-11 VITALS — SYSTOLIC BLOOD PRESSURE: 129 MMHG | DIASTOLIC BLOOD PRESSURE: 84 MMHG

## 2024-12-11 LAB
ANION GAP SERPL CALCULATED.4IONS-SCNC: 9 MMOL/L (ref 3–11)
BUN SERPL-MCNC: 14 MG/DL (ref 8–24)
CALCIUM SERPL-MCNC: 8.6 MG/DL (ref 8.5–10.1)
CHLORIDE SERPL-SCNC: 107 MMOL/L (ref 98–108)
CO2 SERPL-SCNC: 26 MMOL/L (ref 21–32)
CREAT SERPL-MCNC: 0.57 MG/DL (ref 0.6–1.2)
GLUCOSE SERPL-MCNC: 118 MG/DL (ref 70–99)
POTASSIUM SERPL-SCNC: 2.7 MMOL/L (ref 3.5–5.5)
SODIUM SERPL-SCNC: 139 MMOL/L (ref 136–145)

## 2024-12-11 NOTE — NUR
CALLED DR SCHAFFER-
PT BECAME AGGITATED WHEN STAFF WERE ASSISTING WITH AN ATTENDS CHANGE. HE BEGAN
SLAPPING AT THE NURSE. STAFF EXPLAINED HE NEEDS TO USE HIS WORDS AND NOT HIT
PEOPLE. THE PT CONTINUED TO STRIKE AT STAFF. STAFF ATTEMPTED TO PROVIDE SPACE
AND THE PT SAT UP AND SILVIA HIS ARM BACK TO PREPARE TO HIT AGIAN. THIS RN LEFT
THE SITUATION. CALLED FOR CHARGE RN WHO ASSISTED IN CALMING THE PT. HE WAS
ASSISTED UP TO THE BEDSIDE COMMODE. CALLED DR MARKS AND REQUESTED ATIVAN. PT
RECIEVED A OT DOSE AT THIS TIME OF DAY YESTERDAY AND 12 HOURS PRIOR HE
RECIEVED THE DAILY PRN DOSE OF ATIVAN.  WILL REVIEW. NO NEW ORDERS AT THIS
TIME.

## 2024-12-11 NOTE — NUR
SHIFT SUMMARY-
PT HAS CONTINUED TO BE AGGITATED SINCE 1530, WHEN HE BEGAN LASHING OUT AT
STAFF WHILE THEY WERE PROVIDING CARE. CALLED  AND NOTIFIED HER, NO NEW
ORDERS WERE RECIEVED. REVIEWED PT HOME MED LIST AND HIS HOME MEDICATIONS WERE
NOT IN THE EMAR. CALLED DR MARKS AND SHE ORDERED THE PT HOME MEDS. PT
RECIEVED PRN ABILIFY AS SOON AS IT WAS AVAILABLE. PRN ATIVAN IS A DAILY DOSE
AND THE PT RECIEVED IT THIS AM. PRN PERCOCET GIVEN AS THE PT HAS PAIN BUT
DENIES IT WHEN ASKED. (NON VERBAL CUES FOR PAIN). MEDICATED WITH PRN FENTANYL
THIS EVENING AND THE PT SEEMED TO RELAX FOR 5 MINUTES. PT SITTING UP IN THE
RECLINER YELLING OUT, SEVERAL STATEMENTS ON REPEAT, "I NEED TO GO TO THE
BATHROOM", (EVEN WHEN HE WENT 5 MINUTES AGO FOR 20 MINUTES STAFF ARE 1:1 FOR
PT SAFETY WHEN HE IS ON THE COMODE). "I NEED ONE!" WHEN ASKED ONE WHAT? HE HAS
NO ANSWER. HE HAD A VARIETY, SMALL SODA, ICE WATER, NON-ICE WATER, CHOCOLATE
MILKSHAKE, STRAWBERRY MILKSHAKE AVAILABLE TO HIM T/O THE SHIFT, BUT HE WOULD
CALL OUT "THIRSTY!" WHEN STAFF WOULD HELP HIM WITH SOMETHING HE WOULD STOP
THEM ON THE WAY OUT "CAN I ASK YOU A QUESTION?"
 
HE SEENS ANXIOUS AND AGGITATED TO THIS RN. NO RESCUE MEDS AVAILABLE, ASKED
NIGHT RN NOT TO USE THE RESCUE DOSE OF ATIVAN AS THE PT HAS A SCHEDULED DOSE
TONIGHT. HOME MEDS START TONIGHT. REPORT COMPLETED WITH NIGHT RN. PT IN THE
ROOM SITTING UP IN HIS RECLINER YELLING "I NEED SOMETHING" WOUND CARE DRESSING
CHAGE COMPLETED THIS MORNING.

## 2024-12-11 NOTE — NUR
FAMILY CALLED THIS MORNING
THE PATIENT'S SISTER REQUESTS THAT THIS PATIENT BE TESTED FOR PSA AND URIC
ACID LABS. SHE ALSO WANTS THE HOSPITALIST TO KNOW THAT HER BROTHER DOES GO
INTO SEIZURES WITH LARGE BOWEL MOVEMENTS.

## 2024-12-11 NOTE — NUR
SHIFT SUMMARY
ADMITTED FOR ION. FULL CODE. SACRAL DECUB ULCER STG 4 DEBRIDED ON 12/07. DR. BENDER IS SURGICAL CONSULT. ANTICIPATING PLACEMENT. MONITORING LABS,
REPLACING ELECTROLYTES AS INDICATED. DIARRHEA REPORTED ON PREVIOUS SHIFT.
DIARRHEA NOTED ON THIS SHIFT AS WELL. HE IS A 1 ASSIST TO BSC. ON RA.
MODERATE TO SEVERE COGNITIVE DELAY. REGULAR DIET. HE DOES GAG WITH PO MEDS BUT
HE DOES NOT APPEAR TO ASPIRATE OR COUGH WITH THEIR INGESTION. HORNER IN
PLACE FOR RETENTION. HX OF AUTISM AS WELL.

## 2024-12-12 VITALS — DIASTOLIC BLOOD PRESSURE: 86 MMHG | SYSTOLIC BLOOD PRESSURE: 150 MMHG

## 2024-12-12 VITALS — DIASTOLIC BLOOD PRESSURE: 90 MMHG | SYSTOLIC BLOOD PRESSURE: 140 MMHG

## 2024-12-12 VITALS — DIASTOLIC BLOOD PRESSURE: 90 MMHG | SYSTOLIC BLOOD PRESSURE: 139 MMHG

## 2024-12-12 VITALS — SYSTOLIC BLOOD PRESSURE: 124 MMHG | DIASTOLIC BLOOD PRESSURE: 77 MMHG

## 2024-12-12 LAB
ALBUMIN SERPL BCP-MCNC: 2.5 G/DL (ref 3.4–5)
ANION GAP SERPL CALCULATED.4IONS-SCNC: 10 MMOL/L (ref 3–11)
BUN SERPL-MCNC: 11 MG/DL (ref 8–24)
CALCIUM SERPL-MCNC: 8.4 MG/DL (ref 8.5–10.1)
CHLORIDE SERPL-SCNC: 108 MMOL/L (ref 98–108)
CO2 SERPL-SCNC: 27 MMOL/L (ref 21–32)
CREAT SERPL-MCNC: 0.4 MG/DL (ref 0.6–1.2)
DEPRECATED RDW RBC AUTO: 43.2 FL (ref 35.1–46.3)
ERYTHROCYTE [DISTWIDTH] IN BLOOD BY AUTOMATED COUNT: 13.2 % (ref 11.7–14.2)
GLUCOSE SERPL-MCNC: 127 MG/DL (ref 70–99)
HCT VFR BLD AUTO: 29.6 % (ref 37–53)
HGB BLD-MCNC: 10.3 G/DL (ref 13.5–17.5)
MCHC RBC AUTO-ENTMCNC: 34.8 G/DL (ref 31.5–36.5)
MCV RBC AUTO: 90 FL (ref 80–100)
NRBC # BLD AUTO: 0 K/MM3 (ref 0–0.02)
NRBC BLD AUTO-RTO: 0 /100 WBC (ref 0–0.2)
PHOSPHATE SERPL-MCNC: 2.4 MG/DL (ref 2.5–4.9)
PLATELET # BLD AUTO: 182 K/MM3 (ref 150–400)
POTASSIUM SERPL-SCNC: 2.8 MMOL/L (ref 3.5–5.5)
SODIUM SERPL-SCNC: 142 MMOL/L (ref 136–145)

## 2024-12-12 NOTE — NUR
WOUND CARE COMPLETED PER MD ORDERS-
PT WOUND DRESSING SOILED COMPLETELY THIS AM, FROM WOUND DRAINAGE. DRESSING
CHANGE COMPLETED. PT STATED PAIN IMPROVED AFTER THE DRESSING CHANGE. IV
K-PHOS ORDERED. PT IV PAINFUL ON INITIAL FLUSH, NO SWELLING NOTED, REDNESS
NOTED JUST ABOVE, NAIN QUIROZ ASSESSED AS WELL PT DID NOT APPEAR PAINFUL ANYMORE
WITH THE FLUSH. NO SIGN OF SWELLING, STARTED K-PHOS, NO SIGN OF SWELLING,
REDNESS IMPROVED WITH THE INFUSION, NO C/O PAIN FROM PT. WILL CTM.

## 2024-12-12 NOTE — NUR
SHIFT SUMMARY
ADMITTED FOR ION. FULL CODE. PLAN IS FOR PLACEMENT. IV ANTIB RX ARE SCHEDULED.
HE WILL NEED OUTPT WOUND CENTER TREATMENT. HORNER IN PLACE FOR RETENTION.
REGULAR DIET. 1 ASSIST TO BSC. DECUBITUS ULCER STG 4 ON COCCYX, I&D PERFORMED
ON 12/07/24. DR. BENDER IS SURGICAL CONSULT. AUTISTIC, DEVELOPMENTAL DELAY,
SCHIZOPHRENIC. HE YELLS OUT FREQUENTLY. HE LIKES STAFF TO BE IN THE ROOM
CONTINUOUSLY. LARGE AMOUNT OF DIARRHEA THIS SHIFT AGAIN. ON RA. A&O TO SELF.
BUT HE DID REMEMBER ME FROM MY PREVIOUS NOC SHIFT WITH HIM.

## 2024-12-12 NOTE — NUR
SHIFT SUMMARY-
PT HAS HAD A LESS EVENFUL DAY TODAY. HE HAS BEEN COOPERATIVE WITH STAFF, PAIN
MEDS WERE GIVEN. PT BEGAN EATING FOOD TODAY WITHOUT GAGGING. HIS SISTER CALLED
AND WAS UPDATED. SHE ORDERED HIM SOME PIZZA, HE ATE 2.5 SLICES OF DOMINOES FOR
DINNER. HE WANTED TO GET BACK INTO BED AFTER HE WAS DONE EATING. PT IN BED AT
THE TIME OF BEDSIDE REPORT. WOUND CARE DRESSING CHANGE COMPLETED EARLY IN THE
SHIFT. PT FREQUENT LOOSE STOOLS WERE GREATLY REDUCED AFTER THE BANNANA FLAKES.
PAIN WAS IMPROVED WITH ONE DOSE OF PAIN MEDS AND A DRESSING CHANGE. PT IN BED,
CALL LIGHT IN REACH WATCHING TV, NO S&S OF DISTRESS NOTED AT THE TIME OF
BEDSIDE REPORT.

## 2024-12-13 VITALS — DIASTOLIC BLOOD PRESSURE: 77 MMHG | SYSTOLIC BLOOD PRESSURE: 127 MMHG

## 2024-12-13 VITALS — SYSTOLIC BLOOD PRESSURE: 144 MMHG | DIASTOLIC BLOOD PRESSURE: 89 MMHG

## 2024-12-13 LAB
ALBUMIN SERPL BCP-MCNC: 2.5 G/DL (ref 3.4–5)
ANION GAP SERPL CALCULATED.4IONS-SCNC: 10 MMOL/L (ref 3–11)
BUN SERPL-MCNC: 8 MG/DL (ref 8–24)
CALCIUM SERPL-MCNC: 8.5 MG/DL (ref 8.5–10.1)
CHLORIDE SERPL-SCNC: 106 MMOL/L (ref 98–108)
CO2 SERPL-SCNC: 27 MMOL/L (ref 21–32)
CREAT SERPL-MCNC: 0.49 MG/DL (ref 0.6–1.2)
GLUCOSE SERPL-MCNC: 115 MG/DL (ref 70–99)
PHOSPHATE SERPL-MCNC: 3.1 MG/DL (ref 2.5–4.9)
POTASSIUM SERPL-SCNC: 3.5 MMOL/L (ref 3.5–5.5)
SODIUM SERPL-SCNC: 139 MMOL/L (ref 136–145)

## 2024-12-13 NOTE — NUR
SHIFT SUMMARY:
PT AOX2 TO SELF AND PERSON. COOPERATIVE AND REDIRECTABLE. WITHDRAWN AND
VOCALIZING PAIN BUT WHEN ASKED DENIES ANY PAIN. FORGETFUL AND DOESNT CALL
APPROPRIATELY. DID CONSUME ALL OF THEIR REBEKAH AND BANANA FLAKES. TOLERATING
MEDS WELL. SLEPT MOST OF THE NIGHT BUT WOKE UP AROUND 5 AM. HAD A FULL CHANGE
AND DRESSING WAS CLEAN DRY AND INTACT. PT RESTING IN BED, BED IN LOWEST
POSITION AND 3 BED RAILS ARE UP, CALL LIGHT IN REACH. CONTINUING CARE.

## 2024-12-13 NOTE — NUR
DISCHARGE SUMMARY
PT DC THIS SHIFT. DC INSTRUCTION GONE OVER WITH PT AND PT SISTER BATOOL VIA
PHONE. BATOOL EXPRESSED CONCERN ABOUT NOT BEING ABLE TO  PT MEDICATION
PRIOR TO PHARMACY CLOSING AS MEDICATION ARE STILL NOT READY. THIS NURSE
ADMINISTERED PT NIGHT DOSE OF ABT. PT LEFT VIA WHEELCHAIR BY TRANSPORTATION
SERVICE. DISCHARGE PACKET PLACED IN FOLDER WHICH WAS PLACED IN PT BELONGING
BAD AND SENT WITH PT.

## 2024-12-14 ENCOUNTER — HOSPITAL ENCOUNTER (EMERGENCY)
Dept: HOSPITAL 95 - ER | Age: 59
Discharge: HOME | End: 2024-12-14
Payer: MEDICARE

## 2024-12-14 VITALS — DIASTOLIC BLOOD PRESSURE: 98 MMHG | SYSTOLIC BLOOD PRESSURE: 155 MMHG

## 2024-12-14 VITALS — BODY MASS INDEX: 29.99 KG/M2 | HEIGHT: 65 IN | WEIGHT: 180.01 LBS

## 2024-12-14 DIAGNOSIS — G20.A1: ICD-10-CM

## 2024-12-14 DIAGNOSIS — I10: ICD-10-CM

## 2024-12-14 DIAGNOSIS — Z79.899: ICD-10-CM

## 2024-12-14 DIAGNOSIS — L89.154: Primary | ICD-10-CM

## 2024-12-14 DIAGNOSIS — G40.909: ICD-10-CM

## 2024-12-14 DIAGNOSIS — F32.A: ICD-10-CM

## 2024-12-14 PROCEDURE — A9270 NON-COVERED ITEM OR SERVICE: HCPCS

## 2024-12-14 NOTE — NUR
SISTER CALLED BACK, AGAIN. STATES SHE IS HAVING HIM SENT BACK BY AMBULANCE.
CONTINUES TO HAVE TROUBLES GETTING MEDICATIONS SINCE PARTIALLY FILLED AT ONE
PHARMACY BUT WAS NOT PICKED UP.

## 2024-12-14 NOTE — NUR
SISTER, BATOOL, CALLED.  STATED THAT THEY WERE NOT ABLE TO  MEDICATIONS
BECAUSE PHARMACY WAS CLOSED.  RX SENT TO [facility] PER SISTER'S
REQEUST.  SISTER WANTING RX DELIVERED TO HER AND STATING SHE CAN'T LEAVE HIM.
SISTER ALSO STATES THAT HOME HEALTH HAS NOT CONTACTED HER TO MANAGE HIS WOUND
CARE.  SISTER SAYS THAT SHE IS CONSIDERING CALLING 911 AND HAVING HIM BROUGHT
BACK.

## 2025-05-16 ENCOUNTER — HOSPITAL ENCOUNTER (OUTPATIENT)
Dept: HOSPITAL 95 - LAB | Age: 60
Discharge: HOME | End: 2025-05-16
Payer: MEDICARE

## 2025-05-16 DIAGNOSIS — F69: Primary | ICD-10-CM
